# Patient Record
Sex: FEMALE | Race: BLACK OR AFRICAN AMERICAN | NOT HISPANIC OR LATINO | Employment: OTHER | URBAN - METROPOLITAN AREA
[De-identification: names, ages, dates, MRNs, and addresses within clinical notes are randomized per-mention and may not be internally consistent; named-entity substitution may affect disease eponyms.]

---

## 2022-02-15 ENCOUNTER — TRANSCRIBE ORDERS (OUTPATIENT)
Dept: ADMINISTRATIVE | Facility: HOSPITAL | Age: 27
End: 2022-02-15

## 2022-02-15 DIAGNOSIS — H47.10 UNSPECIFIED PAPILLEDEMA: Primary | ICD-10-CM

## 2022-03-04 ENCOUNTER — TRANSCRIBE ORDERS (OUTPATIENT)
Dept: GENERAL RADIOLOGY | Facility: HOSPITAL | Age: 27
End: 2022-03-04

## 2022-03-04 DIAGNOSIS — H47.10 PAPILLEDEMA: Primary | ICD-10-CM

## 2022-03-13 NOTE — PROGRESS NOTES
03/15/22 0001   Pre-Procedure Phone Call   Procedure Time Verified Yes   Arrival Time 1145  (03/15/2022)   Procedure Location Verified Yes   Medical History Reviewed No   NPO Status Reinforced Yes   Ride and Caregiver Arranged Yes   Phone Number for Ride/Caregiver Mother   Patient Knows to Bring Current Medications No   Bring Outside Films Requested No

## 2022-03-15 ENCOUNTER — HOSPITAL ENCOUNTER (OUTPATIENT)
Dept: GENERAL RADIOLOGY | Facility: HOSPITAL | Age: 27
Discharge: HOME | End: 2022-03-15
Admitting: RADIOLOGY

## 2022-03-15 VITALS
DIASTOLIC BLOOD PRESSURE: 73 MMHG | HEART RATE: 70 BPM | TEMPERATURE: 97.7 F | WEIGHT: 170 LBS | BODY MASS INDEX: 29.02 KG/M2 | SYSTOLIC BLOOD PRESSURE: 122 MMHG | HEIGHT: 64 IN | OXYGEN SATURATION: 96 % | RESPIRATION RATE: 12 BRPM

## 2022-03-15 DIAGNOSIS — H47.10 PAPILLEDEMA: ICD-10-CM

## 2022-03-15 LAB
APPEARANCE FLD: CLEAR
COLOR FLD: COLORLESS
GLUCOSE CSF-MCNC: 64 MG/DL (ref 40–70)
METHOD: NORMAL
NUC CELL # FLD: 1 /MM3
PROT CSF-MCNC: 20.1 MG/DL (ref 15–45)
RBC # FLD AUTO: 52 /MM3

## 2022-03-15 PROCEDURE — 0 LIDOCAINE 1 % SOLUTION: Performed by: RADIOLOGY

## 2022-03-15 PROCEDURE — 82945 GLUCOSE OTHER FLUID: CPT | Performed by: OPHTHALMOLOGY

## 2022-03-15 PROCEDURE — 84157 ASSAY OF PROTEIN OTHER: CPT | Performed by: OPHTHALMOLOGY

## 2022-03-15 PROCEDURE — 89050 BODY FLUID CELL COUNT: CPT | Performed by: OPHTHALMOLOGY

## 2022-03-15 RX ORDER — ALPRAZOLAM 0.5 MG/1
0.5 TABLET ORAL ONCE
Status: COMPLETED | OUTPATIENT
Start: 2022-03-15 | End: 2022-03-15

## 2022-03-15 RX ORDER — LIDOCAINE HYDROCHLORIDE 10 MG/ML
10 INJECTION, SOLUTION INFILTRATION; PERINEURAL ONCE
Status: COMPLETED | OUTPATIENT
Start: 2022-03-15 | End: 2022-03-15

## 2022-03-15 RX ADMIN — ALPRAZOLAM 0.5 MG: 0.5 TABLET ORAL at 12:48

## 2022-03-15 RX ADMIN — LIDOCAINE HYDROCHLORIDE 4 ML: 10 INJECTION, SOLUTION INFILTRATION; PERINEURAL at 13:54

## 2022-03-15 NOTE — NURSING NOTE
Patient out to car via wheelchair and RN assist. Patient's mom picked up. No issues or concerns noted.

## 2022-03-15 NOTE — DISCHARGE INSTRUCTIONS
EDUCATION /DISCHARGE INSTRUCTION   A lumbar puncture involves insertion of a sterile needle into the spinal canal by the physician   This procedure is performed for several reasons: to detect increased intracranial pressure, the presence of blood in cerebrospinal fluid (CFS), to obtain CSF specimens for laboratory studies, to administer drugs and to relieve pressure by removing CSF.    You will lie on your stomach with a pillow under your stomach.  This opens the vertebral space to allow access to the spinal needle.  The physician will sterilize the area using antiseptic solution and numb the area where the spinal needle will be placed.  If CSF is being removed for specimen, you may be asked to push or beardown to assist with the flow of the CSF. When the procedure is complete, a band aid will be placed over the injection site and you will be taken to the recovery area.    POTENTIAL RISKS OF A LUMBAR PUNCTURE INCLUDE BUT ARE NOT LIMITED TO:  *  Headache    *  Swelling at puncture site  *  Bleeding into the spinal canal *  Temporary difficulty urinating  *  Leakage of CSF   *  Fever  *  Pain caused by nerve irritation    BENEFIT OF PROCEDURE:  This is the only way to obtain spinal fluid or relieve pressure due to increased CSF.  There is no alternative.  THIS EDUCATION INFORMATION WAS REVIEWED PRIOR TO PROCEDURE AND CONSENT. Patient initials__________________Time____1239_____________    FOLLOWING A LUMBAR PUNCTURE:  *  Drink plenty of liquids -  Caffeine is recommended   *  Lie down flat for the next 8 hours.  If you get a headache, lie down flat for 24 hours, continue to force fluids and call your doctor if  the headache persists.  *  Go straight home.  DO NOT DRIVE    CALL YOUR DOCTOR IF YOU HAVE:  *  A severe headache that will not go away  *  Redness, swelling or drainage from the puncture site  *  Neck stiffness, numbness or weakness  *  Any new or severe symptoms    Following the procedure, you should continue  to take all of your medications as directed by your primary physician unless otherwise instructed.  There have been no changes to the medications you provided us (with the following exceptions if applicable):    Resume taking your blood thinner or Aspirin on_____3/15/22________________    YOU ARE THE MOST IMPORTANT FACTOR IN YOUR RECOVERY.  IF YOU HAVE QUESTIONS OR CONCERNS PLEASE CALL THE RADIOLOGY NURSES AT [phone]

## 2022-03-15 NOTE — H&P
Name: Noemi Sinclair ADMIT: 3/15/2022   : 1995  PCP: Provider, No Known    MRN: [id number] LOS: 0 days   AGE/SEX: 26 y.o. female  ROOM: Room/bed info not found       Chief complaint   Patient is a 26 y.o. female presents with papilledema.     Past Surgical History:  Past Surgical History:   Procedure Laterality Date   •  SECTION         Past Medical History:  History reviewed. No pertinent past medical history.    Home Medications:  (Not in a hospital admission)      Allergies:  Patient has no known allergies.    Family History:  No family history on file.    Social History:        Objective     Physical Exam:    unremarkable for intended procedure    Vital Signs  Temp:  [97.7 °F (36.5 °C)] 97.7 °F (36.5 °C)  Heart Rate:  [89] 89  Resp:  [18] 18  BP: (112)/(78) 112/78    Anticipated Surgical Procedure:  LP    The risks, benefits and alternatives of this procedure have been discussed with the patient or responsible party: Yes        Chase Ingram MD  03/15/22  13:13 EDT

## 2022-03-15 NOTE — NURSING NOTE
Patient arrived in xray triage for a lumbar puncture. Protective goggles and mask in place with all patient interactions today.

## 2022-03-16 ENCOUNTER — TELEPHONE (OUTPATIENT)
Dept: INTERVENTIONAL RADIOLOGY/VASCULAR | Facility: HOSPITAL | Age: 27
End: 2022-03-16

## 2022-12-27 ENCOUNTER — APPOINTMENT (OUTPATIENT)
Dept: CT IMAGING | Facility: HOSPITAL | Age: 27
End: 2022-12-27

## 2022-12-27 ENCOUNTER — HOSPITAL ENCOUNTER (EMERGENCY)
Facility: HOSPITAL | Age: 27
Discharge: HOME | End: 2022-12-27
Attending: EMERGENCY MEDICINE | Admitting: EMERGENCY MEDICINE

## 2022-12-27 VITALS
TEMPERATURE: 97.9 F | RESPIRATION RATE: 14 BRPM | DIASTOLIC BLOOD PRESSURE: 60 MMHG | SYSTOLIC BLOOD PRESSURE: 110 MMHG | OXYGEN SATURATION: 97 % | HEART RATE: 72 BPM

## 2022-12-27 DIAGNOSIS — G43.909 MIGRAINE WITHOUT STATUS MIGRAINOSUS, NOT INTRACTABLE, UNSPECIFIED MIGRAINE TYPE: Primary | ICD-10-CM

## 2022-12-27 PROCEDURE — 96374 THER/PROPH/DIAG INJ IV PUSH: CPT

## 2022-12-27 PROCEDURE — 25010000002 DIPHENHYDRAMINE PER 50 MG: Performed by: EMERGENCY MEDICINE

## 2022-12-27 PROCEDURE — 70450 CT HEAD/BRAIN W/O DYE: CPT

## 2022-12-27 PROCEDURE — 99283 EMERGENCY DEPT VISIT LOW MDM: CPT

## 2022-12-27 PROCEDURE — 25010000002 PROCHLORPERAZINE 10 MG/2ML SOLUTION: Performed by: EMERGENCY MEDICINE

## 2022-12-27 PROCEDURE — 25010000002 KETOROLAC TROMETHAMINE PER 15 MG: Performed by: EMERGENCY MEDICINE

## 2022-12-27 PROCEDURE — 96375 TX/PRO/DX INJ NEW DRUG ADDON: CPT

## 2022-12-27 RX ORDER — KETOROLAC TROMETHAMINE 15 MG/ML
15 INJECTION, SOLUTION INTRAMUSCULAR; INTRAVENOUS ONCE
Status: COMPLETED | OUTPATIENT
Start: 2022-12-27 | End: 2022-12-27

## 2022-12-27 RX ORDER — PROCHLORPERAZINE EDISYLATE 5 MG/ML
5 INJECTION INTRAMUSCULAR; INTRAVENOUS EVERY 6 HOURS PRN
Status: DISCONTINUED | OUTPATIENT
Start: 2022-12-27 | End: 2022-12-27 | Stop reason: HOSPADM

## 2022-12-27 RX ORDER — ACETAZOLAMIDE 125 MG/1
250 TABLET ORAL 2 TIMES DAILY
Qty: 60 TABLET | Refills: 0 | Status: SHIPPED | OUTPATIENT
Start: 2022-12-27

## 2022-12-27 RX ORDER — DIPHENHYDRAMINE HYDROCHLORIDE 50 MG/ML
25 INJECTION INTRAMUSCULAR; INTRAVENOUS ONCE
Status: COMPLETED | OUTPATIENT
Start: 2022-12-27 | End: 2022-12-27

## 2022-12-27 RX ADMIN — PROCHLORPERAZINE EDISYLATE 5 MG: 5 INJECTION INTRAMUSCULAR; INTRAVENOUS at 18:29

## 2022-12-27 RX ADMIN — DIPHENHYDRAMINE HYDROCHLORIDE 25 MG: 50 INJECTION, SOLUTION INTRAMUSCULAR; INTRAVENOUS at 18:29

## 2022-12-27 RX ADMIN — KETOROLAC TROMETHAMINE 15 MG: 15 INJECTION, SOLUTION INTRAMUSCULAR; INTRAVENOUS at 18:29

## 2022-12-27 NOTE — ED PROVIDER NOTES
" EMERGENCY DEPARTMENT ENCOUNTER    Room Number:  B03/03  Date seen:  2022  PCP: Provider, No Known  Historian: Patient  Relevant information and history provided by sources other than the patient will be included in the context section.  Review of pertinent past medical records may also be included in the context section rather than MDM to avoid unnecessary redundancy.    HPI:  Chief Complaint: Headache  A complete HPI/ROS/PMH/PSH/SH/FH are unobtainable due to: Nothing  Context: Noemi Sinclair is a 27 y.o. female who presents to the ED c/o patient presents with severe global headache for the last 2 days which she feels like is \"migraine\".  Patient has a history of these, and this feels similar but is not responding to home medications.  She has no fever, but does have nausea, photophobia and phonophobia.  The pain is global as before with no radiation.    Patient also says that she has a history of \"fluid on the brain\" but when I asked her to further characterize that she said \"I do not know I saw some specialist here and it should be in the computer\".  It appears that she is prescribed Diamox but has not been taking it, and that leads me to think about pseudotumor cerebri.  She has not had any surgical intervention, but looking back in epic it appears that she had a large-volume LP a few years ago but there was no associated documentation to talk about the indications of the results        REVIEW OF SYSTEMS  Review of Systems   All negative except for above      PAST MEDICAL HISTORY  Active Ambulatory Problems     Diagnosis Date Noted   • No Active Ambulatory Problems     Resolved Ambulatory Problems     Diagnosis Date Noted   • No Resolved Ambulatory Problems     No Additional Past Medical History         PAST SURGICAL HISTORY  Past Surgical History:   Procedure Laterality Date   •  SECTION           FAMILY HISTORY  History reviewed. No pertinent family history.      SOCIAL HISTORY  Social History "     Socioeconomic History   • Marital status: Single   Tobacco Use   • Smoking status: Never   • Smokeless tobacco: Never   Vaping Use   • Vaping Use: Never used   Substance and Sexual Activity   • Alcohol use: Never   • Drug use: Defer   • Sexual activity: Defer         ALLERGIES  Patient has no known allergies.          PHYSICAL EXAM  ED Triage Vitals   Temp Heart Rate Resp BP SpO2   12/27/22 1736 12/27/22 1736 12/27/22 1739 -- 12/27/22 1736   97.9 °F (36.6 °C) 93 16  93 %      Temp src Heart Rate Source Patient Position BP Location FiO2 (%)   -- -- -- -- --              Physical Exam      GENERAL: no acute distress, nontoxic appearing sitting up in the chair on her phone in no distress  HENT: nares patent, NCAT, neck nontender with no meningismus  EYES: no scleral icterus, PERRL, EOMI, no photophobia  CV: regular rhythm, normal rate, distal pulses symmetric and palpable  RESPIRATORY: normal effort  ABDOMEN: soft, nondistended nontender with normal bowel sound  MUSCULOSKELETAL: no deformity  NEURO: alert, moves all extremities, follows commands, normal neuro exam with no focal deficits  PSYCH:  calm, cooperative  SKIN: warm, dry with no rash    Vital signs and nursing notes reviewed.          LAB RESULTS  No results found for this or any previous visit (from the past 24 hour(s)).    Ordered the above labs and reviewed the results.        RADIOLOGY  No Radiology Exams Resulted Within Past 24 Hours    Ordered the above noted radiological studies. Reviewed by me in PACS.            PROCEDURES  Procedures              MEDICATIONS GIVEN IN ER  Medications   ketorolac (TORADOL) injection 15 mg (15 mg Intravenous Given 12/27/22 1829)   diphenhydrAMINE (BENADRYL) injection 25 mg (25 mg Intravenous Given 12/27/22 1829)                   MEDICAL DECISION MAKING, PROGRESS, and CONSULTS    All labs have been independently reviewed by me.    All radiology studies have been independently reviewed by me and discussed with  radiologist dictating the report.    EKG's are independently viewed and interpreted by me.      The discussion below represents my analysis of pertinent findings related to patient's current condition, review of past medical history and available medical records, differential diagnosis, and discussion with consultants regarding this encounter.          ED Course as of 12/29/22 0000   Wed Dec 28, 2022   2359 CT scan of the head without contrast shows nothing acute [DP]   2359 Her symptoms were completely relieved with 1 migraine cocktail and she was resting comfortably in the stretcher/recliner [DP]   u Dec 29, 2022   0000 I did spend a lot of time looking through the records on epic and in the [employer] system to try and find more specific information about her previous neurologic work-up.  I cannot find any notes from a neurologist to that facility, but I do see as above that she had a large-volume LP about 3 years ago for headache.  At this point I do not think she has pseudotumor, but I will put her back on her Diamox and have her follow-up with neurology in the outpatient setting. [DP]      ED Course User Index  [DP] Demario Kelley MD              All appropriate hygiene and PPE requirements were satisfied with this patient encounter    FINAL DIAGNOSES  Final diagnoses:   Migraine without status migrainosus, not intractable, unspecified migraine type         DISPOSITION  Discharge            Latest Documented Vital Signs:  As of 00:00 EST  BP- 110/60 HR- 72 Temp- 97.9 °F (36.6 °C) O2 sat- 97%        --    Please note that portions of this were completed with a voice recognition program.       Note Disclaimer: At [facility], we believe that sharing information builds trust and better relationships. You are receiving this note because you are receiving care at [facility] or recently visited. It is possible you will see health information before a provider has talked with you about it. This kind of  information can be easy to misunderstand. To help you fully understand what it      Demario Kelley MD  12/29/22 0000

## 2022-12-27 NOTE — ED TRIAGE NOTES
Patient arrives to ED via PV. Patient reports having a headache for 2 days. Patient reports associated nausea and some dizziness.

## 2022-12-27 NOTE — ED NOTES
"Pt c/o pressure h/a behind eyes associated with nausea and dizziness x2 days. Pt reports that she was seen at eye doctor a few weeks ago and told that she had \"fluid on the brain and was prescribed some medications but I did not take it\".   "